# Patient Record
Sex: FEMALE | Race: WHITE | NOT HISPANIC OR LATINO
[De-identification: names, ages, dates, MRNs, and addresses within clinical notes are randomized per-mention and may not be internally consistent; named-entity substitution may affect disease eponyms.]

---

## 2017-04-21 ENCOUNTER — APPOINTMENT (OUTPATIENT)
Dept: INTERNAL MEDICINE | Facility: CLINIC | Age: 47
End: 2017-04-21

## 2017-04-21 ENCOUNTER — LABORATORY RESULT (OUTPATIENT)
Age: 47
End: 2017-04-21

## 2017-04-21 VITALS
RESPIRATION RATE: 17 BRPM | HEART RATE: 90 BPM | SYSTOLIC BLOOD PRESSURE: 107 MMHG | TEMPERATURE: 98.1 F | OXYGEN SATURATION: 100 % | HEIGHT: 61 IN | DIASTOLIC BLOOD PRESSURE: 72 MMHG | BODY MASS INDEX: 20.39 KG/M2 | WEIGHT: 108 LBS

## 2017-04-21 DIAGNOSIS — Z00.00 ENCOUNTER FOR GENERAL ADULT MEDICAL EXAMINATION W/OUT ABNORMAL FINDINGS: ICD-10-CM

## 2017-04-21 DIAGNOSIS — Z82.49 FAMILY HISTORY OF ISCHEMIC HEART DISEASE AND OTHER DISEASES OF THE CIRCULATORY SYSTEM: ICD-10-CM

## 2017-04-24 ENCOUNTER — OTHER (OUTPATIENT)
Age: 47
End: 2017-04-24

## 2017-04-24 LAB
25(OH)D3 SERPL-MCNC: 17.1 NG/ML
ALBUMIN SERPL ELPH-MCNC: 4.6 G/DL
ALP BLD-CCNC: 53 U/L
ALT SERPL-CCNC: 8 U/L
ANION GAP SERPL CALC-SCNC: 13 MMOL/L
APPEARANCE: ABNORMAL
AST SERPL-CCNC: 17 U/L
BASOPHILS # BLD AUTO: 0.04 K/UL
BASOPHILS NFR BLD AUTO: 0.5 %
BILIRUB SERPL-MCNC: 0.5 MG/DL
BILIRUBIN URINE: NEGATIVE
BLOOD URINE: NEGATIVE
BUN SERPL-MCNC: 19 MG/DL
CALCIUM SERPL-MCNC: 10.1 MG/DL
CHLORIDE SERPL-SCNC: 101 MMOL/L
CHOLEST SERPL-MCNC: 225 MG/DL
CHOLEST/HDLC SERPL: 2.7 RATIO
CO2 SERPL-SCNC: 24 MMOL/L
COLOR: YELLOW
CREAT SERPL-MCNC: 0.92 MG/DL
EOSINOPHIL # BLD AUTO: 0.05 K/UL
EOSINOPHIL NFR BLD AUTO: 0.7 %
FOLATE SERPL-MCNC: 11.9 NG/ML
GLUCOSE QUALITATIVE U: NORMAL MG/DL
GLUCOSE SERPL-MCNC: 105 MG/DL
HBA1C MFR BLD HPLC: 5.2 %
HCT VFR BLD CALC: 43.4 %
HDLC SERPL-MCNC: 83 MG/DL
HGB BLD-MCNC: 14.2 G/DL
IMM GRANULOCYTES NFR BLD AUTO: 0.1 %
KETONES URINE: ABNORMAL
LDLC SERPL CALC-MCNC: 134 MG/DL
LEUKOCYTE ESTERASE URINE: NEGATIVE
LYMPHOCYTES # BLD AUTO: 0.82 K/UL
LYMPHOCYTES NFR BLD AUTO: 10.8 %
MAN DIFF?: NORMAL
MCHC RBC-ENTMCNC: 31.1 PG
MCHC RBC-ENTMCNC: 32.7 GM/DL
MCV RBC AUTO: 95 FL
MONOCYTES # BLD AUTO: 0.36 K/UL
MONOCYTES NFR BLD AUTO: 4.8 %
NEUTROPHILS # BLD AUTO: 6.28 K/UL
NEUTROPHILS NFR BLD AUTO: 83.1 %
NITRITE URINE: NEGATIVE
PH URINE: 6
PLATELET # BLD AUTO: 283 K/UL
POTASSIUM SERPL-SCNC: 4.9 MMOL/L
PROT SERPL-MCNC: 7.7 G/DL
PROTEIN URINE: NEGATIVE MG/DL
RBC # BLD: 4.57 M/UL
RBC # FLD: 13.5 %
SODIUM SERPL-SCNC: 138 MMOL/L
SPECIFIC GRAVITY URINE: 1.02
T4 FREE SERPL-MCNC: 1 NG/DL
TRIGL SERPL-MCNC: 40 MG/DL
TSH SERPL-ACNC: 2.11 UIU/ML
UROBILINOGEN URINE: NORMAL MG/DL
VIT B12 SERPL-MCNC: 268 PG/ML
WBC # FLD AUTO: 7.56 K/UL

## 2017-05-30 ENCOUNTER — MESSAGE (OUTPATIENT)
Age: 47
End: 2017-05-30

## 2017-05-30 DIAGNOSIS — R79.89 OTHER SPECIFIED ABNORMAL FINDINGS OF BLOOD CHEMISTRY: ICD-10-CM

## 2017-05-30 LAB
BASOPHILS # BLD AUTO: 0.04 K/UL
BASOPHILS NFR BLD AUTO: 0.7 %
EOSINOPHIL # BLD AUTO: 0.09 K/UL
EOSINOPHIL NFR BLD AUTO: 1.6 %
HCT VFR BLD CALC: 42.4 %
HGB BLD-MCNC: 13.3 G/DL
IMM GRANULOCYTES NFR BLD AUTO: 0 %
LYMPHOCYTES # BLD AUTO: 0.57 K/UL
LYMPHOCYTES NFR BLD AUTO: 10 %
MAN DIFF?: NORMAL
MCHC RBC-ENTMCNC: 29.4 PG
MCHC RBC-ENTMCNC: 31.4 GM/DL
MCV RBC AUTO: 93.6 FL
MONOCYTES # BLD AUTO: 0.45 K/UL
MONOCYTES NFR BLD AUTO: 7.9 %
NEUTROPHILS # BLD AUTO: 4.53 K/UL
NEUTROPHILS NFR BLD AUTO: 79.8 %
PLATELET # BLD AUTO: 279 K/UL
RBC # BLD: 4.53 M/UL
RBC # FLD: 13.6 %
WBC # FLD AUTO: 5.68 K/UL

## 2017-06-01 ENCOUNTER — MESSAGE (OUTPATIENT)
Age: 47
End: 2017-06-01

## 2017-08-22 ENCOUNTER — TRANSCRIPTION ENCOUNTER (OUTPATIENT)
Age: 47
End: 2017-08-22

## 2017-08-22 ENCOUNTER — EMERGENCY (EMERGENCY)
Facility: HOSPITAL | Age: 47
LOS: 1 days | Discharge: HOME | End: 2017-08-22
Attending: EMERGENCY MEDICINE | Admitting: EMERGENCY MEDICINE
Payer: COMMERCIAL

## 2017-08-22 VITALS
RESPIRATION RATE: 20 BRPM | TEMPERATURE: 98 F | DIASTOLIC BLOOD PRESSURE: 74 MMHG | HEART RATE: 86 BPM | SYSTOLIC BLOOD PRESSURE: 113 MMHG | OXYGEN SATURATION: 100 %

## 2017-08-22 VITALS
SYSTOLIC BLOOD PRESSURE: 114 MMHG | RESPIRATION RATE: 16 BRPM | HEIGHT: 61 IN | HEART RATE: 96 BPM | TEMPERATURE: 98 F | WEIGHT: 108.03 LBS | DIASTOLIC BLOOD PRESSURE: 79 MMHG

## 2017-08-22 DIAGNOSIS — Z98.890 OTHER SPECIFIED POSTPROCEDURAL STATES: Chronic | ICD-10-CM

## 2017-08-22 LAB
ALBUMIN SERPL ELPH-MCNC: 3.9 G/DL — SIGNIFICANT CHANGE UP (ref 3.3–5)
ALP SERPL-CCNC: 49 U/L — SIGNIFICANT CHANGE UP (ref 30–120)
ALT FLD-CCNC: 23 U/L DA — SIGNIFICANT CHANGE UP (ref 10–60)
ANION GAP SERPL CALC-SCNC: 11 MMOL/L — SIGNIFICANT CHANGE UP (ref 5–17)
APPEARANCE UR: CLEAR — SIGNIFICANT CHANGE UP
AST SERPL-CCNC: 24 U/L — SIGNIFICANT CHANGE UP (ref 10–40)
BASOPHILS # BLD AUTO: 0.1 K/UL — SIGNIFICANT CHANGE UP (ref 0–0.2)
BASOPHILS NFR BLD AUTO: 1.1 % — SIGNIFICANT CHANGE UP (ref 0–2)
BILIRUB SERPL-MCNC: 0.6 MG/DL — SIGNIFICANT CHANGE UP (ref 0.2–1.2)
BILIRUB UR-MCNC: NEGATIVE — SIGNIFICANT CHANGE UP
BUN SERPL-MCNC: 12 MG/DL — SIGNIFICANT CHANGE UP (ref 7–23)
CALCIUM SERPL-MCNC: 9.3 MG/DL — SIGNIFICANT CHANGE UP (ref 8.4–10.5)
CHLORIDE SERPL-SCNC: 104 MMOL/L — SIGNIFICANT CHANGE UP (ref 96–108)
CO2 SERPL-SCNC: 24 MMOL/L — SIGNIFICANT CHANGE UP (ref 22–31)
COLOR SPEC: YELLOW — SIGNIFICANT CHANGE UP
CREAT SERPL-MCNC: 0.63 MG/DL — SIGNIFICANT CHANGE UP (ref 0.5–1.3)
DIFF PNL FLD: ABNORMAL
EOSINOPHIL # BLD AUTO: 0 K/UL — SIGNIFICANT CHANGE UP (ref 0–0.5)
EOSINOPHIL NFR BLD AUTO: 0.2 % — SIGNIFICANT CHANGE UP (ref 0–6)
GLUCOSE SERPL-MCNC: 109 MG/DL — HIGH (ref 70–99)
GLUCOSE UR QL: NEGATIVE MG/DL — SIGNIFICANT CHANGE UP
HCG SERPL-ACNC: <1 MIU/ML — SIGNIFICANT CHANGE UP
HCT VFR BLD CALC: 42.8 % — SIGNIFICANT CHANGE UP (ref 34.5–45)
HGB BLD-MCNC: 14 G/DL — SIGNIFICANT CHANGE UP (ref 11.5–15.5)
KETONES UR-MCNC: ABNORMAL
LEUKOCYTE ESTERASE UR-ACNC: NEGATIVE — SIGNIFICANT CHANGE UP
LYMPHOCYTES # BLD AUTO: 0.6 K/UL — LOW (ref 1–3.3)
LYMPHOCYTES # BLD AUTO: 7.5 % — LOW (ref 13–44)
MCHC RBC-ENTMCNC: 30.9 PG — SIGNIFICANT CHANGE UP (ref 27–34)
MCHC RBC-ENTMCNC: 32.7 GM/DL — SIGNIFICANT CHANGE UP (ref 32–36)
MCV RBC AUTO: 94.4 FL — SIGNIFICANT CHANGE UP (ref 80–100)
MONOCYTES # BLD AUTO: 0.3 K/UL — SIGNIFICANT CHANGE UP (ref 0–0.9)
MONOCYTES NFR BLD AUTO: 3.6 % — SIGNIFICANT CHANGE UP (ref 2–14)
NEUTROPHILS # BLD AUTO: 7.3 K/UL — SIGNIFICANT CHANGE UP (ref 1.8–7.4)
NEUTROPHILS NFR BLD AUTO: 87.6 % — HIGH (ref 43–77)
NITRITE UR-MCNC: NEGATIVE — SIGNIFICANT CHANGE UP
PH UR: 6.5 — SIGNIFICANT CHANGE UP (ref 5–8)
PLATELET # BLD AUTO: 275 K/UL — SIGNIFICANT CHANGE UP (ref 150–400)
POTASSIUM SERPL-MCNC: 3.7 MMOL/L — SIGNIFICANT CHANGE UP (ref 3.5–5.3)
POTASSIUM SERPL-SCNC: 3.7 MMOL/L — SIGNIFICANT CHANGE UP (ref 3.5–5.3)
PROT SERPL-MCNC: 7.7 G/DL — SIGNIFICANT CHANGE UP (ref 6–8.3)
PROT UR-MCNC: NEGATIVE MG/DL — SIGNIFICANT CHANGE UP
RBC # BLD: 4.54 M/UL — SIGNIFICANT CHANGE UP (ref 3.8–5.2)
RBC # FLD: 11.9 % — SIGNIFICANT CHANGE UP (ref 10.3–14.5)
RBC CASTS # UR COMP ASSIST: SIGNIFICANT CHANGE UP /HPF (ref 0–4)
SODIUM SERPL-SCNC: 139 MMOL/L — SIGNIFICANT CHANGE UP (ref 135–145)
SP GR SPEC: 1.01 — SIGNIFICANT CHANGE UP (ref 1.01–1.02)
UROBILINOGEN FLD QL: NEGATIVE MG/DL — SIGNIFICANT CHANGE UP
WBC # BLD: 8.3 K/UL — SIGNIFICANT CHANGE UP (ref 3.8–10.5)
WBC # FLD AUTO: 8.3 K/UL — SIGNIFICANT CHANGE UP (ref 3.8–10.5)
WBC UR QL: SIGNIFICANT CHANGE UP

## 2017-08-22 PROCEDURE — 81001 URINALYSIS AUTO W/SCOPE: CPT

## 2017-08-22 PROCEDURE — 99284 EMERGENCY DEPT VISIT MOD MDM: CPT | Mod: 25

## 2017-08-22 PROCEDURE — 80053 COMPREHEN METABOLIC PANEL: CPT

## 2017-08-22 PROCEDURE — 85027 COMPLETE CBC AUTOMATED: CPT

## 2017-08-22 PROCEDURE — 84702 CHORIONIC GONADOTROPIN TEST: CPT

## 2017-08-22 PROCEDURE — 99285 EMERGENCY DEPT VISIT HI MDM: CPT

## 2017-08-22 PROCEDURE — 96360 HYDRATION IV INFUSION INIT: CPT

## 2017-08-22 PROCEDURE — 36415 COLL VENOUS BLD VENIPUNCTURE: CPT

## 2017-08-22 PROCEDURE — 93010 ELECTROCARDIOGRAM REPORT: CPT

## 2017-08-22 PROCEDURE — 93005 ELECTROCARDIOGRAM TRACING: CPT

## 2017-08-22 PROCEDURE — 96374 THER/PROPH/DIAG INJ IV PUSH: CPT

## 2017-08-22 RX ORDER — SODIUM CHLORIDE 9 MG/ML
1000 INJECTION INTRAMUSCULAR; INTRAVENOUS; SUBCUTANEOUS
Qty: 0 | Refills: 0 | Status: DISCONTINUED | OUTPATIENT
Start: 2017-08-22 | End: 2017-08-26

## 2017-08-22 RX ORDER — ALPRAZOLAM 0.25 MG
1 TABLET ORAL
Qty: 0 | Refills: 0 | COMMUNITY

## 2017-08-22 RX ORDER — ONDANSETRON 8 MG/1
4 TABLET, FILM COATED ORAL ONCE
Qty: 0 | Refills: 0 | Status: COMPLETED | OUTPATIENT
Start: 2017-08-22 | End: 2017-08-22

## 2017-08-22 RX ORDER — SODIUM CHLORIDE 9 MG/ML
3 INJECTION INTRAMUSCULAR; INTRAVENOUS; SUBCUTANEOUS ONCE
Qty: 0 | Refills: 0 | Status: COMPLETED | OUTPATIENT
Start: 2017-08-22 | End: 2017-08-22

## 2017-08-22 RX ORDER — SODIUM CHLORIDE 9 MG/ML
1000 INJECTION INTRAMUSCULAR; INTRAVENOUS; SUBCUTANEOUS ONCE
Qty: 0 | Refills: 0 | Status: COMPLETED | OUTPATIENT
Start: 2017-08-22 | End: 2017-08-22

## 2017-08-22 RX ORDER — DULOXETINE HYDROCHLORIDE 30 MG/1
0 CAPSULE, DELAYED RELEASE ORAL
Qty: 0 | Refills: 0 | COMMUNITY

## 2017-08-22 RX ADMIN — SODIUM CHLORIDE 3 MILLILITER(S): 9 INJECTION INTRAMUSCULAR; INTRAVENOUS; SUBCUTANEOUS at 17:40

## 2017-08-22 RX ADMIN — SODIUM CHLORIDE 1000 MILLILITER(S): 9 INJECTION INTRAMUSCULAR; INTRAVENOUS; SUBCUTANEOUS at 17:40

## 2017-08-22 RX ADMIN — ONDANSETRON 4 MILLIGRAM(S): 8 TABLET, FILM COATED ORAL at 18:13

## 2017-08-22 RX ADMIN — SODIUM CHLORIDE 125 MILLILITER(S): 9 INJECTION INTRAMUSCULAR; INTRAVENOUS; SUBCUTANEOUS at 17:53

## 2017-08-22 RX ADMIN — SODIUM CHLORIDE 125 MILLILITER(S): 9 INJECTION INTRAMUSCULAR; INTRAVENOUS; SUBCUTANEOUS at 18:13

## 2017-08-22 NOTE — ED PROVIDER NOTE - PROGRESS NOTE DETAILS
All results were explained to patient and/or family and a copy of all available results given.  pt felt better.  pt worked out on elliptical for an hour today.

## 2017-08-22 NOTE — ED ADULT NURSE NOTE - CHPI ED SYMPTOMS NEG
no shortness of breath/no chest pain/no cough/no vomiting/no chills/no fever/no diaphoresis/no back pain

## 2017-08-22 NOTE — ED ADULT NURSE REASSESSMENT NOTE - NS ED NURSE REASSESS COMMENT FT1
Pt reports she is feeling better.  fluids infusing.
Patient states she feels anxious. Wendy PRETTY aware. Patient may take her own Xanax per PA. Patient took 1/2 tab of 0.5mg.
Patient continues to c/o of feeling anxious and requests to take the other half of her Xanax. Patient took 1/2 tab of Xanax 0.5mg at this time. SUKHWINDER olivas.

## 2017-08-22 NOTE — ED PROVIDER NOTE - OBJECTIVE STATEMENT
47 yo female presents with syncopal episode today at home, states did not eat much last night, today also did not eat much, was not hungry, went to exercise.  came home was cleaning her house, her  was home, she leaned forward to pick something up off floor, felt dizzy and "passed out" for less than a minute, her  witnessed episode.  denies head injury.  hx of anxiety, took her xanax while in ED.  PMD Dr Clemons

## 2017-08-22 NOTE — ED PROVIDER NOTE - EYES NEGATIVE STATEMENT, MLM
no generalized weakness, no body aches, no dizziness, no syncope
no discharge, no irritation, no pain, no redness, and no visual changes.

## 2017-08-22 NOTE — ED ADULT TRIAGE NOTE - CHIEF COMPLAINT QUOTE
I fainted earlier today around 11am and they did an EKG that was normal but they said I need basic labs

## 2017-11-24 NOTE — ED ADULT NURSE NOTE - NS TRANSFER DISPOSITION PATIENT BELONGINGS
Per patient, provider requested that a message be sent to provider regarding medication. Per patient dosage is fine and is working well for patient     Outreach ,  Dayanara Boss     with patient

## 2018-06-14 NOTE — ED PROVIDER NOTE - CROS ED GU ALL NEG
Chief Complaint   Patient presents with    Follow-up     liver transplant     Visit Vitals    /59 (BP 1 Location: Left arm, BP Patient Position: Sitting)    Pulse 73    Temp 97 °F (36.1 °C) (Tympanic)    Ht 5' 3\" (1.6 m)    Wt 157 lb (71.2 kg)    SpO2 96%    BMI 27.81 kg/m2     PHQ over the last two weeks 6/14/2018   Little interest or pleasure in doing things Not at all   Feeling down, depressed or hopeless Not at all   Total Score PHQ 2 0     1. Have you been to the ER, urgent care clinic since your last visit? Hospitalized since your last visit? No    2. Have you seen or consulted any other health care providers outside of the [facility] since your last visit? Include any pap smears or colon screening.  No negative...

## 2018-11-14 ENCOUNTER — MEDICATION RENEWAL (OUTPATIENT)
Age: 48
End: 2018-11-14

## 2018-11-14 DIAGNOSIS — E53.8 DEFICIENCY OF OTHER SPECIFIED B GROUP VITAMINS: ICD-10-CM

## 2018-12-10 PROBLEM — E78.00 ELEVATED CHOLESTEROL: Status: ACTIVE | Noted: 2018-12-10

## 2018-12-10 PROBLEM — R79.89 LOW VITAMIN D LEVEL: Status: ACTIVE | Noted: 2018-12-10

## 2018-12-10 PROBLEM — Z00.00 ANNUAL PHYSICAL EXAM: Status: ACTIVE | Noted: 2017-04-21

## 2018-12-11 ENCOUNTER — APPOINTMENT (OUTPATIENT)
Dept: INTERNAL MEDICINE | Facility: CLINIC | Age: 48
End: 2018-12-11
Payer: COMMERCIAL

## 2018-12-11 VITALS
DIASTOLIC BLOOD PRESSURE: 80 MMHG | SYSTOLIC BLOOD PRESSURE: 115 MMHG | WEIGHT: 109 LBS | HEIGHT: 61 IN | HEART RATE: 91 BPM | OXYGEN SATURATION: 99 % | TEMPERATURE: 98.6 F | RESPIRATION RATE: 17 BRPM | BODY MASS INDEX: 20.58 KG/M2

## 2018-12-11 DIAGNOSIS — R79.89 OTHER SPECIFIED ABNORMAL FINDINGS OF BLOOD CHEMISTRY: ICD-10-CM

## 2018-12-11 DIAGNOSIS — Z00.00 ENCOUNTER FOR GENERAL ADULT MEDICAL EXAMINATION W/OUT ABNORMAL FINDINGS: ICD-10-CM

## 2018-12-11 DIAGNOSIS — E78.00 PURE HYPERCHOLESTEROLEMIA, UNSPECIFIED: ICD-10-CM

## 2018-12-11 DIAGNOSIS — R79.9 ABNORMAL FINDING OF BLOOD CHEMISTRY, UNSPECIFIED: ICD-10-CM

## 2018-12-11 PROCEDURE — 99396 PREV VISIT EST AGE 40-64: CPT

## 2020-03-27 NOTE — ED ADULT NURSE NOTE - GASTROINTESTINAL ASSESSMENT
[facility] Emergency Department  [address]  [facility] [place] [address]  Phone:  [phone]  Fax:  [phone]                                    Shayla Marino   MRN: [id number]    Department:  [facility] Emergency Department   Date of Visit:  3/27/2020           After Visit Summary Signature Page    I have received my discharge instructions, and my questions have been answered. I have discussed any challenges I see with this plan with the nurse or doctor.    ..........................................................................................................................................  Patient/Patient Representative Signature      ..........................................................................................................................................  Patient Representative Print Name and Relationship to Patient    ..................................................               ................................................  Date                                   Time    ..........................................................................................................................................  Reviewed by Signature/Title    ...................................................              ..............................................  Date                                               Time          22EPIC Rev 08/18        - - -

## 2020-04-17 ENCOUNTER — TRANSCRIPTION ENCOUNTER (OUTPATIENT)
Age: 50
End: 2020-04-17

## 2020-05-06 ENCOUNTER — TRANSCRIPTION ENCOUNTER (OUTPATIENT)
Age: 50
End: 2020-05-06

## 2020-09-19 ENCOUNTER — TRANSCRIPTION ENCOUNTER (OUTPATIENT)
Age: 50
End: 2020-09-19

## 2022-02-20 ENCOUNTER — TRANSCRIPTION ENCOUNTER (OUTPATIENT)
Age: 52
End: 2022-02-20

## 2022-07-12 ENCOUNTER — OFFICE (OUTPATIENT)
Dept: URBAN - METROPOLITAN AREA CLINIC 70 | Facility: CLINIC | Age: 52
Setting detail: OPHTHALMOLOGY
End: 2022-07-12
Payer: COMMERCIAL

## 2022-07-12 ENCOUNTER — RX ONLY (RX ONLY)
Age: 52
End: 2022-07-12

## 2022-07-12 DIAGNOSIS — Z79.899: ICD-10-CM

## 2022-07-12 DIAGNOSIS — H16.223: ICD-10-CM

## 2022-07-12 DIAGNOSIS — H40.013: ICD-10-CM

## 2022-07-12 DIAGNOSIS — H35.54: ICD-10-CM

## 2022-07-12 PROCEDURE — 92004 COMPRE OPH EXAM NEW PT 1/>: CPT | Performed by: OPHTHALMOLOGY

## 2022-07-12 PROCEDURE — 92083 EXTENDED VISUAL FIELD XM: CPT | Performed by: OPHTHALMOLOGY

## 2022-07-12 PROCEDURE — 92250 FUNDUS PHOTOGRAPHY W/I&R: CPT | Performed by: OPHTHALMOLOGY

## 2022-07-12 ASSESSMENT — REFRACTION_AUTOREFRACTION
OD_AXIS: 046
OS_AXIS: 78
OS_CYLINDER: -0.50
OS_SPHERE: -0.50
OD_SPHERE: -1.00
OD_CYLINDER: -0.75

## 2022-07-12 ASSESSMENT — KERATOMETRY
OS_K2POWER_DIOPTERS: 43.00
OD_AXISANGLE_DEGREES: 108
OS_K1POWER_DIOPTERS: 42.75
OS_AXISANGLE_DEGREES: 124
OD_K2POWER_DIOPTERS: 43.50
OD_K1POWER_DIOPTERS: 42.50

## 2022-07-12 ASSESSMENT — CONFRONTATIONAL VISUAL FIELD TEST (CVF)
OD_FINDINGS: FULL
OS_FINDINGS: FULL

## 2022-07-12 ASSESSMENT — REFRACTION_CURRENTRX
OD_CYLINDER: -0.25
OS_OVR_VA: 20/
OD_AXIS: 176
OD_OVR_VA: 20/
OS_SPHERE: -0.75
OD_SPHERE: -1.00
OS_CYLINDER: 0.00
OS_AXIS: 180

## 2022-07-12 ASSESSMENT — VISUAL ACUITY
OD_BCVA: 20/20
OS_BCVA: 20/20-2

## 2022-07-12 ASSESSMENT — SUPERFICIAL PUNCTATE KERATITIS (SPK)
OS_SPK: 1+
OD_SPK: 1+

## 2022-07-12 ASSESSMENT — AXIALLENGTH_DERIVED
OD_AL: 24.3339
OS_AL: 24.1255

## 2022-07-12 ASSESSMENT — SPHEQUIV_DERIVED
OS_SPHEQUIV: -0.75
OD_SPHEQUIV: -1.375

## 2022-08-15 ENCOUNTER — OFFICE (OUTPATIENT)
Dept: URBAN - METROPOLITAN AREA CLINIC 70 | Facility: CLINIC | Age: 52
Setting detail: OPHTHALMOLOGY
End: 2022-08-15
Payer: COMMERCIAL

## 2022-08-15 DIAGNOSIS — H00.12: ICD-10-CM

## 2022-08-15 PROCEDURE — 92012 INTRM OPH EXAM EST PATIENT: CPT | Performed by: OPHTHALMOLOGY

## 2022-08-15 ASSESSMENT — REFRACTION_AUTOREFRACTION
OD_CYLINDER: -0.50
OS_CYLINDER: -0.50
OS_AXIS: 078
OS_SPHERE: -0.75
OD_SPHERE: -1.00
OD_AXIS: 042

## 2022-08-15 ASSESSMENT — CONFRONTATIONAL VISUAL FIELD TEST (CVF)
OS_FINDINGS: FULL
OD_FINDINGS: FULL

## 2022-08-15 ASSESSMENT — KERATOMETRY
OS_K1POWER_DIOPTERS: 42.50
OD_K2POWER_DIOPTERS: 43.25
OD_AXISANGLE_DEGREES: 108
OS_AXISANGLE_DEGREES: 125
OD_K1POWER_DIOPTERS: 42.75
OS_K2POWER_DIOPTERS: 43.00

## 2022-08-15 ASSESSMENT — REFRACTION_CURRENTRX
OS_OVR_VA: 20/
OD_CYLINDER: -0.25
OS_CYLINDER: 0.00
OD_AXIS: 176
OS_AXIS: 180
OS_SPHERE: -0.75
OD_OVR_VA: 20/
OD_SPHERE: -1.00

## 2022-08-15 ASSESSMENT — AXIALLENGTH_DERIVED
OD_AL: 24.2822
OS_AL: 24.2763

## 2022-08-15 ASSESSMENT — SPHEQUIV_DERIVED
OD_SPHEQUIV: -1.25
OS_SPHEQUIV: -1

## 2022-08-15 ASSESSMENT — VISUAL ACUITY
OS_BCVA: 20/20-2
OD_BCVA: 20/20

## 2022-08-15 ASSESSMENT — SUPERFICIAL PUNCTATE KERATITIS (SPK)
OS_SPK: 1+
OD_SPK: 1+

## 2022-08-23 ENCOUNTER — OFFICE (OUTPATIENT)
Dept: URBAN - METROPOLITAN AREA CLINIC 70 | Facility: CLINIC | Age: 52
Setting detail: OPHTHALMOLOGY
End: 2022-08-23
Payer: COMMERCIAL

## 2022-08-23 DIAGNOSIS — H00.12: ICD-10-CM

## 2022-08-23 PROCEDURE — 92012 INTRM OPH EXAM EST PATIENT: CPT | Performed by: OPHTHALMOLOGY

## 2022-08-23 ASSESSMENT — REFRACTION_CURRENTRX
OD_CYLINDER: -0.25
OD_SPHERE: -1.00
OD_AXIS: 176
OS_OVR_VA: 20/
OS_AXIS: 180
OD_OVR_VA: 20/
OS_SPHERE: -0.75
OS_CYLINDER: 0.00

## 2022-08-23 ASSESSMENT — SPHEQUIV_DERIVED
OD_SPHEQUIV: -1.5
OS_SPHEQUIV: -0.75

## 2022-08-23 ASSESSMENT — KERATOMETRY
OD_K1POWER_DIOPTERS: 42.75
OD_K2POWER_DIOPTERS: 43.25
OS_AXISANGLE_DEGREES: 125
OS_K2POWER_DIOPTERS: 43.00
OD_AXISANGLE_DEGREES: 108
OS_K1POWER_DIOPTERS: 42.50

## 2022-08-23 ASSESSMENT — REFRACTION_AUTOREFRACTION
OS_CYLINDER: -0.50
OD_AXIS: 042
OS_SPHERE: -0.50
OD_CYLINDER: -0.50
OS_AXIS: 073
OD_SPHERE: -1.25

## 2022-08-23 ASSESSMENT — VISUAL ACUITY
OS_BCVA: 20/20-
OD_BCVA: 20/20

## 2022-08-23 ASSESSMENT — CONFRONTATIONAL VISUAL FIELD TEST (CVF)
OS_FINDINGS: FULL
OD_FINDINGS: FULL

## 2022-08-23 ASSESSMENT — AXIALLENGTH_DERIVED
OS_AL: 24.1736
OD_AL: 24.3858

## 2022-08-23 ASSESSMENT — SUPERFICIAL PUNCTATE KERATITIS (SPK)
OS_SPK: 1+
OD_SPK: 1+

## 2022-09-01 ENCOUNTER — RX ONLY (RX ONLY)
Age: 52
End: 2022-09-01

## 2022-09-01 ENCOUNTER — OFFICE (OUTPATIENT)
Dept: URBAN - METROPOLITAN AREA CLINIC 109 | Facility: CLINIC | Age: 52
Setting detail: OPHTHALMOLOGY
End: 2022-09-01
Payer: COMMERCIAL

## 2022-09-01 DIAGNOSIS — H00.12: ICD-10-CM

## 2022-09-01 PROCEDURE — 67800 REMOVE EYELID LESION: CPT | Performed by: OPHTHALMOLOGY

## 2022-09-01 PROCEDURE — 92285 EXTERNAL OCULAR PHOTOGRAPHY: CPT | Performed by: OPHTHALMOLOGY

## 2022-09-01 ASSESSMENT — SPHEQUIV_DERIVED
OS_SPHEQUIV: -0.75
OD_SPHEQUIV: -1.5

## 2022-09-01 ASSESSMENT — KERATOMETRY
OD_AXISANGLE_DEGREES: 108
OS_AXISANGLE_DEGREES: 125
OD_K1POWER_DIOPTERS: 42.75
OS_K2POWER_DIOPTERS: 43.00
OD_K2POWER_DIOPTERS: 43.25
OS_K1POWER_DIOPTERS: 42.50

## 2022-09-01 ASSESSMENT — LID EXAM ASSESSMENTS
OS_BLEPHARITIS: 2+
OD_BLEPHARITIS: 2+

## 2022-09-01 ASSESSMENT — CONFRONTATIONAL VISUAL FIELD TEST (CVF)
OD_FINDINGS: FULL
OS_FINDINGS: FULL

## 2022-09-01 ASSESSMENT — REFRACTION_AUTOREFRACTION
OS_AXIS: 073
OD_AXIS: 042
OD_CYLINDER: -0.50
OS_SPHERE: -0.50
OD_SPHERE: -1.25
OS_CYLINDER: -0.50

## 2022-09-01 ASSESSMENT — REFRACTION_CURRENTRX
OS_SPHERE: -0.75
OD_OVR_VA: 20/
OS_CYLINDER: 0.00
OD_AXIS: 176
OD_SPHERE: -1.00
OD_CYLINDER: -0.25
OS_AXIS: 180
OS_OVR_VA: 20/

## 2022-09-01 ASSESSMENT — SUPERFICIAL PUNCTATE KERATITIS (SPK)
OS_SPK: 1+
OD_SPK: 1+

## 2022-09-01 ASSESSMENT — AXIALLENGTH_DERIVED
OD_AL: 24.3858
OS_AL: 24.1736

## 2022-09-01 ASSESSMENT — VISUAL ACUITY
OS_BCVA: 20/20
OD_BCVA: 20/20

## 2022-09-15 ENCOUNTER — OFFICE (OUTPATIENT)
Dept: URBAN - METROPOLITAN AREA CLINIC 109 | Facility: CLINIC | Age: 52
Setting detail: OPHTHALMOLOGY
End: 2022-09-15
Payer: COMMERCIAL

## 2022-09-15 DIAGNOSIS — H01.001: ICD-10-CM

## 2022-09-15 DIAGNOSIS — H01.002: ICD-10-CM

## 2022-09-15 DIAGNOSIS — H01.005: ICD-10-CM

## 2022-09-15 DIAGNOSIS — H01.004: ICD-10-CM

## 2022-09-15 DIAGNOSIS — H02.843: ICD-10-CM

## 2022-09-15 DIAGNOSIS — H00.12: ICD-10-CM

## 2022-09-15 DIAGNOSIS — H02.831: ICD-10-CM

## 2022-09-15 PROBLEM — H35.54 RPE CHANGES ; BOTH EYES: Status: ACTIVE | Noted: 2022-07-12

## 2022-09-15 PROBLEM — Z79.899 PLAQUENIL/MEDICATION ; BOTH EYES: Status: ACTIVE | Noted: 2022-07-12

## 2022-09-15 PROBLEM — H40.013 GLAUCOMA SUSPECT, LOW RISK 1-2 FACTORS; BOTH EYES: Status: ACTIVE | Noted: 2022-07-12

## 2022-09-15 PROBLEM — H02.834 DERMATOCHALASIS; RIGHT UPPER LID, LEFT UPPER LID: Status: ACTIVE | Noted: 2022-09-15

## 2022-09-15 PROBLEM — H16.223 DRY EYE SYNDROME K SICCA; BOTH EYES: Status: ACTIVE | Noted: 2022-07-12

## 2022-09-15 PROCEDURE — SCCOS COSMETIC CONSULTATION: Performed by: OPHTHALMOLOGY

## 2022-09-15 PROCEDURE — 99213 OFFICE O/P EST LOW 20 MIN: CPT | Performed by: OPHTHALMOLOGY

## 2022-09-15 ASSESSMENT — LID POSITION - DERMATOCHALASIS
OS_DERMATOCHALASIS: LUL 1+
OD_DERMATOCHALASIS: RUL 1+

## 2022-09-15 ASSESSMENT — REFRACTION_AUTOREFRACTION
OS_AXIS: 073
OD_AXIS: 042
OD_SPHERE: -1.25
OS_CYLINDER: -0.50
OS_SPHERE: -0.50
OD_CYLINDER: -0.50

## 2022-09-15 ASSESSMENT — VISUAL ACUITY
OD_BCVA: 20/20
OS_BCVA: 20/20

## 2022-09-15 ASSESSMENT — KERATOMETRY
OD_AXISANGLE_DEGREES: 108
OS_AXISANGLE_DEGREES: 125
OD_K2POWER_DIOPTERS: 43.25
OS_K2POWER_DIOPTERS: 43.00
OS_K1POWER_DIOPTERS: 42.50
OD_K1POWER_DIOPTERS: 42.75

## 2022-09-15 ASSESSMENT — LID EXAM ASSESSMENTS
OD_BLEPHARITIS: RLL RUL 2+
OS_CENTRAL_FAT_PROLAPSE: 3+
OS_COMMENTS: 3+ MEDIAL FAT PROLAPSE
OD_MEDIAL_FAT_PROLAPSE: 3+
OD_COMMENTS: 3+ LATERAL FAT PROLAPS
OD_CENTRAL_FAT_PROLAPSE: 3+
OS_BLEPHARITIS: LLL LUL 2+
OS_COMMENTS: 3+ LATERAL FAT PROLAPS

## 2022-09-15 ASSESSMENT — SPHEQUIV_DERIVED
OS_SPHEQUIV: -0.75
OD_SPHEQUIV: -1.5

## 2022-09-15 ASSESSMENT — SUPERFICIAL PUNCTATE KERATITIS (SPK)
OS_SPK: 1+
OD_SPK: 1+

## 2022-09-15 ASSESSMENT — CONFRONTATIONAL VISUAL FIELD TEST (CVF)
OS_FINDINGS: FULL
OD_FINDINGS: FULL

## 2022-09-15 ASSESSMENT — AXIALLENGTH_DERIVED
OS_AL: 24.1736
OD_AL: 24.3858

## 2023-05-01 NOTE — ED ADULT NURSE NOTE - ED CARDIAC RHYTHM

## 2024-06-26 ENCOUNTER — APPOINTMENT (OUTPATIENT)
Dept: ORTHOPEDIC SURGERY | Facility: CLINIC | Age: 54
End: 2024-06-26

## 2024-06-26 VITALS — BODY MASS INDEX: 20.01 KG/M2 | HEIGHT: 61 IN | WEIGHT: 106 LBS

## 2024-06-26 DIAGNOSIS — S86.111A STRAIN OF OTHER MUSCLE(S) AND TENDON(S) OF POSTERIOR MUSCLE GROUP AT LOWER LEG LEVEL, RIGHT LEG, INITIAL ENCOUNTER: ICD-10-CM

## 2024-06-26 DIAGNOSIS — M23.91 UNSPECIFIED INTERNAL DERANGEMENT OF RIGHT KNEE: ICD-10-CM

## 2024-06-26 PROCEDURE — 73564 X-RAY EXAM KNEE 4 OR MORE: CPT | Mod: RT

## 2024-06-26 PROCEDURE — 99203 OFFICE O/P NEW LOW 30 MIN: CPT

## 2024-06-26 PROCEDURE — 99204 OFFICE O/P NEW MOD 45 MIN: CPT

## 2024-06-26 RX ORDER — NAPROXEN 500 MG/1
500 TABLET ORAL TWICE DAILY
Qty: 30 | Refills: 1 | Status: ACTIVE | COMMUNITY
Start: 2024-06-26 | End: 1900-01-01

## 2024-07-17 ENCOUNTER — APPOINTMENT (OUTPATIENT)
Dept: ORTHOPEDIC SURGERY | Facility: CLINIC | Age: 54
End: 2024-07-17
Payer: COMMERCIAL

## 2024-07-17 VITALS — HEIGHT: 61 IN | BODY MASS INDEX: 20.01 KG/M2 | WEIGHT: 106 LBS

## 2024-07-17 DIAGNOSIS — M23.91 UNSPECIFIED INTERNAL DERANGEMENT OF RIGHT KNEE: ICD-10-CM

## 2024-07-17 DIAGNOSIS — S86.111A STRAIN OF OTHER MUSCLE(S) AND TENDON(S) OF POSTERIOR MUSCLE GROUP AT LOWER LEG LEVEL, RIGHT LEG, INITIAL ENCOUNTER: ICD-10-CM

## 2024-07-17 PROCEDURE — 99213 OFFICE O/P EST LOW 20 MIN: CPT

## 2024-07-31 ENCOUNTER — RESULT REVIEW (OUTPATIENT)
Age: 54
End: 2024-07-31

## 2024-07-31 ENCOUNTER — APPOINTMENT (OUTPATIENT)
Dept: MRI IMAGING | Facility: CLINIC | Age: 54
End: 2024-07-31
Payer: COMMERCIAL

## 2024-07-31 ENCOUNTER — OUTPATIENT (OUTPATIENT)
Dept: OUTPATIENT SERVICES | Facility: HOSPITAL | Age: 54
LOS: 1 days | End: 2024-07-31
Payer: COMMERCIAL

## 2024-07-31 DIAGNOSIS — M23.91 UNSPECIFIED INTERNAL DERANGEMENT OF RIGHT KNEE: ICD-10-CM

## 2024-07-31 DIAGNOSIS — Z98.890 OTHER SPECIFIED POSTPROCEDURAL STATES: Chronic | ICD-10-CM

## 2024-07-31 PROCEDURE — 73721 MRI JNT OF LWR EXTRE W/O DYE: CPT | Mod: 26,RT

## 2024-07-31 PROCEDURE — 73721 MRI JNT OF LWR EXTRE W/O DYE: CPT

## 2024-08-14 ENCOUNTER — APPOINTMENT (OUTPATIENT)
Dept: ORTHOPEDIC SURGERY | Facility: CLINIC | Age: 54
End: 2024-08-14
Payer: COMMERCIAL

## 2024-08-14 VITALS — BODY MASS INDEX: 20.01 KG/M2 | HEIGHT: 61 IN | WEIGHT: 106 LBS

## 2024-08-14 DIAGNOSIS — S84.11XA INJURY OF PERONEAL NERVE AT LOWER LEG LEVEL, RIGHT LEG, INITIAL ENCOUNTER: ICD-10-CM

## 2024-08-14 DIAGNOSIS — S86.111A STRAIN OF OTHER MUSCLE(S) AND TENDON(S) OF POSTERIOR MUSCLE GROUP AT LOWER LEG LEVEL, RIGHT LEG, INITIAL ENCOUNTER: ICD-10-CM

## 2024-08-14 PROCEDURE — 99214 OFFICE O/P EST MOD 30 MIN: CPT

## 2024-08-14 PROCEDURE — 99213 OFFICE O/P EST LOW 20 MIN: CPT

## 2024-08-14 NOTE — HISTORY OF PRESENT ILLNESS
[de-identified] : 8/14/24: Pt here to review MRI results for right knee.  Reports no change in symptoms since last visit. Describes pain related to certain movements or activities.  7/17/24: Pt here to f/u R knee pain. States fully improved symptoms with rest x 2 weeks, then played again last night, and increased symptoms last night and today. Points to pain posterior knee w/ squatting and flexing the knee.   06/26/2024 Ms. 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 woke up 6/25/24 in 'excruciating pain'. States has hx of foot pain on and off for two weeks. Feels better today post taking Excedrin last night. Elicits pain w/ standing knee flexion and passive knee flexion/quad stretch.

## 2024-08-14 NOTE — PHYSICAL EXAM
[NL (140)] : flexion 140 degrees [NL (0)] : extension 0 degrees [Positive] : positive Juventino [Right] : right foot and ankle [5___] : [facility] 5[unfilled]/5 [] : no pain with varus stress [FreeTextEntry8] : calf is soft and compressible, no sign of dvt

## 2024-08-14 NOTE — DATA REVIEWED
[MRI] : MRI [Right] : of the right [Knee] : knee [Report was reviewed and noted in the chart] : The report was reviewed and noted in the chart [I independently reviewed and interpreted images and report] : I independently reviewed and interpreted images and report [I reviewed the films/CD] : I reviewed the films/CD [FreeTextEntry1] : 07.31.24 ( [place])  1. High-grade patellofemoral chondrosis. 2. Sequela strain injury of the lateral gastrocnemius musculature. 3. Diffuse mild increased muscle signal of the anterior tibialis, extensor digitorum longus and peroneus longus musculature of which differential considerations include sequela of a strain versus muscle denervation changes. No evidence of compression of the peroneal nerve. 4. No meniscal tear.

## 2024-08-14 NOTE — DISCUSSION/SUMMARY
[de-identified] : 53f with MRI of the right knee with concern for muscle strain vs. muscle denervation changes 1) EMG LEs to eval peroneal nerve 2) heat, rest and activity modification 3) rtc after EMG

## 2024-08-19 ENCOUNTER — APPOINTMENT (OUTPATIENT)
Dept: NEUROLOGY | Facility: CLINIC | Age: 54
End: 2024-08-19
Payer: COMMERCIAL

## 2024-08-19 ENCOUNTER — APPOINTMENT (OUTPATIENT)
Age: 54
End: 2024-08-19
Payer: COMMERCIAL

## 2024-08-19 PROCEDURE — 99203 OFFICE O/P NEW LOW 30 MIN: CPT

## 2024-08-19 PROCEDURE — 95911 NRV CNDJ TEST 9-10 STUDIES: CPT

## 2024-08-19 PROCEDURE — 95886 MUSC TEST DONE W/N TEST COMP: CPT

## 2024-08-28 ENCOUNTER — APPOINTMENT (OUTPATIENT)
Dept: ORTHOPEDIC SURGERY | Facility: CLINIC | Age: 54
End: 2024-08-28
Payer: COMMERCIAL

## 2024-08-28 VITALS — BODY MASS INDEX: 20.01 KG/M2 | HEIGHT: 61 IN | WEIGHT: 106 LBS

## 2024-08-28 DIAGNOSIS — M17.11 UNILATERAL PRIMARY OSTEOARTHRITIS, RIGHT KNEE: ICD-10-CM

## 2024-08-28 DIAGNOSIS — S86.111A STRAIN OF OTHER MUSCLE(S) AND TENDON(S) OF POSTERIOR MUSCLE GROUP AT LOWER LEG LEVEL, RIGHT LEG, INITIAL ENCOUNTER: ICD-10-CM

## 2024-08-28 PROCEDURE — 99213 OFFICE O/P EST LOW 20 MIN: CPT

## 2024-08-28 PROCEDURE — 99214 OFFICE O/P EST MOD 30 MIN: CPT

## 2024-08-28 NOTE — HISTORY OF PRESENT ILLNESS
[de-identified] : 8/28/24: Pt here to f/u R knee and attain EMG results. Reports no change in symptoms.   8/14/24: Pt here to review MRI results for right knee.  Reports no change in symptoms since last visit. Describes pain related to certain movements or activities.  7/17/24: Pt here to f/u R knee pain. States fully improved symptoms with rest x 2 weeks, then played again last night, and increased symptoms last night and today. Points to pain posterior knee w/ squatting and flexing the knee.   06/26/2024 Ms. 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 woke up 6/25/24 in 'excruciating pain'. States has hx of foot pain on and off for two weeks. Feels better today post taking Excedrin last night. Elicits pain w/ standing knee flexion and passive knee flexion/quad stretch. 
yes

## 2024-08-28 NOTE — PHYSICAL EXAM
[NL (140)] : flexion 140 degrees [NL (0)] : extension 0 degrees [Positive] : positive Juventino [Right] : right foot and ankle [5___] : [facility] 5[unfilled]/5 [] : no pain with varus stress [FreeTextEntry8] : calf is soft and compressible, no sign of dvt

## 2024-08-28 NOTE — DATA REVIEWED
[EMG Nerve Conduction] : A EMG Nerve Conduction test was completed of the [Lower extremity] : lower extremity [Negative] : negative

## 2024-08-28 NOTE — DISCUSSION/SUMMARY
[de-identified] : 53f with MRI of the right knee with concern for muscle strain vs. muscle denervation changes. EMG negative for nerve compression.  Right knee pf djd, gastroc strain and no meniscal tearing.  1) start physical therapy 2) cryotherapy, rest and activity modification  3) rtc 3 weeks   Entered by Lynette Moore acting as scribe. Dr. Bowman- The documentation recorded by the scribe accurately reflects the service I personally performed and the decisions made by me.

## 2024-09-25 ENCOUNTER — APPOINTMENT (OUTPATIENT)
Dept: ORTHOPEDIC SURGERY | Facility: CLINIC | Age: 54
End: 2024-09-25
Payer: COMMERCIAL

## 2024-09-25 VITALS — WEIGHT: 106 LBS | HEIGHT: 61 IN | BODY MASS INDEX: 20.01 KG/M2

## 2024-09-25 DIAGNOSIS — M17.11 UNILATERAL PRIMARY OSTEOARTHRITIS, RIGHT KNEE: ICD-10-CM

## 2024-09-25 DIAGNOSIS — S86.111A STRAIN OF OTHER MUSCLE(S) AND TENDON(S) OF POSTERIOR MUSCLE GROUP AT LOWER LEG LEVEL, RIGHT LEG, INITIAL ENCOUNTER: ICD-10-CM

## 2024-09-25 PROCEDURE — 99213 OFFICE O/P EST LOW 20 MIN: CPT

## 2024-09-25 NOTE — PHYSICAL EXAM
[NL (140)] : flexion 140 degrees [NL (0)] : extension 0 degrees [Positive] : positive Juventino [Right] : right foot and ankle [5___] : Pending [facility] to [facility] 5[unfilled]/5 [] : no ecchymosis [FreeTextEntry8] : calf is soft and compressible, no sign of dvt ttp lateral gastroc

## 2024-09-25 NOTE — HISTORY OF PRESENT ILLNESS
[de-identified] : 9/25/24: Here to f/up right knee. In PT 1x/week at [facility] with Hermilo. Feeling a little better.  8/28/24: Pt here to f/u R knee and attain EMG results. Reports no change in symptoms.  8/14/24: Pt here to review MRI results for right knee.  Reports no change in symptoms since last visit. Describes pain related to certain movements or activities.  7/17/24: Pt here to f/u R knee pain. States fully improved symptoms with rest x 2 weeks, then played again last night, and increased symptoms last night and today. Points to pain posterior knee w/ squatting and flexing the knee.   06/26/2024 Ms. 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 woke up 6/25/24 in 'excruciating pain'. States has hx of foot pain on and off for two weeks. Feels better today post taking Excedrin last night. Elicits pain w/ standing knee flexion and passive knee flexion/quad stretch.

## 2024-09-25 NOTE — DISCUSSION/SUMMARY
[de-identified] : 53f with right knee pf djd, gastroc strain and no meniscal tearing.  MRI of the right knee with concern for muscle strain vs. muscle denervation changes. EMG negative for nerve compression.   1) c/w physical therapy 2) cryotherapy, rest and activity modification  3) note for continue to work from home  4) rtc 4 weeks  Entered by Lynette Moore acting as scribe. Dr. Bowman- The documentation recorded by the scribe accurately reflects the service I personally performed and the decisions made by me.

## 2024-10-23 ENCOUNTER — APPOINTMENT (OUTPATIENT)
Dept: ORTHOPEDIC SURGERY | Facility: CLINIC | Age: 54
End: 2024-10-23
Payer: COMMERCIAL

## 2024-10-23 VITALS — BODY MASS INDEX: 20.01 KG/M2 | HEIGHT: 61 IN | WEIGHT: 106 LBS

## 2024-10-23 DIAGNOSIS — M17.11 UNILATERAL PRIMARY OSTEOARTHRITIS, RIGHT KNEE: ICD-10-CM

## 2024-10-23 DIAGNOSIS — S86.111A STRAIN OF OTHER MUSCLE(S) AND TENDON(S) OF POSTERIOR MUSCLE GROUP AT LOWER LEG LEVEL, RIGHT LEG, INITIAL ENCOUNTER: ICD-10-CM

## 2024-10-23 PROCEDURE — 99213 OFFICE O/P EST LOW 20 MIN: CPT

## 2025-01-03 ENCOUNTER — NON-APPOINTMENT (OUTPATIENT)
Age: 55
End: 2025-01-03